# Patient Record
Sex: FEMALE | Race: BLACK OR AFRICAN AMERICAN | NOT HISPANIC OR LATINO | URBAN - METROPOLITAN AREA
[De-identification: names, ages, dates, MRNs, and addresses within clinical notes are randomized per-mention and may not be internally consistent; named-entity substitution may affect disease eponyms.]

---

## 2024-10-28 ENCOUNTER — OFFICE VISIT (OUTPATIENT)
Dept: URGENT CARE | Age: 44
End: 2024-10-28
Payer: COMMERCIAL

## 2024-10-28 VITALS
SYSTOLIC BLOOD PRESSURE: 116 MMHG | HEART RATE: 94 BPM | DIASTOLIC BLOOD PRESSURE: 79 MMHG | RESPIRATION RATE: 18 BRPM | BODY MASS INDEX: 25.2 KG/M2 | OXYGEN SATURATION: 100 % | HEIGHT: 71 IN | WEIGHT: 180 LBS

## 2024-10-28 DIAGNOSIS — R21 RASH AND NONSPECIFIC SKIN ERUPTION: Primary | ICD-10-CM

## 2024-10-28 PROCEDURE — 99203 OFFICE O/P NEW LOW 30 MIN: CPT

## 2024-10-28 PROCEDURE — 3008F BODY MASS INDEX DOCD: CPT

## 2024-10-28 PROCEDURE — 96372 THER/PROPH/DIAG INJ SC/IM: CPT

## 2024-10-28 RX ORDER — METHYLPREDNISOLONE SODIUM SUCCINATE 125 MG/2ML
125 INJECTION INTRAMUSCULAR; INTRAVENOUS ONCE
Status: COMPLETED | OUTPATIENT
Start: 2024-10-28 | End: 2024-10-28

## 2025-05-22 ENCOUNTER — OFFICE VISIT (OUTPATIENT)
Dept: URGENT CARE | Age: 45
End: 2025-05-22
Payer: COMMERCIAL

## 2025-05-22 VITALS
RESPIRATION RATE: 16 BRPM | DIASTOLIC BLOOD PRESSURE: 78 MMHG | HEART RATE: 96 BPM | OXYGEN SATURATION: 99 % | HEIGHT: 71 IN | WEIGHT: 183 LBS | TEMPERATURE: 99.6 F | SYSTOLIC BLOOD PRESSURE: 120 MMHG | BODY MASS INDEX: 25.62 KG/M2

## 2025-05-22 DIAGNOSIS — R21 RASH: Primary | ICD-10-CM

## 2025-05-22 PROCEDURE — 99213 OFFICE O/P EST LOW 20 MIN: CPT | Performed by: PERSONAL EMERGENCY RESPONSE ATTENDANT

## 2025-05-22 PROCEDURE — 1036F TOBACCO NON-USER: CPT | Performed by: PERSONAL EMERGENCY RESPONSE ATTENDANT

## 2025-05-22 PROCEDURE — 3008F BODY MASS INDEX DOCD: CPT | Performed by: PERSONAL EMERGENCY RESPONSE ATTENDANT

## 2025-05-22 RX ORDER — FERROUS SULFATE 325(65) MG
325 TABLET ORAL EVERY OTHER DAY
COMMUNITY
Start: 2017-06-27

## 2025-05-22 RX ORDER — METHYLPREDNISOLONE 4 MG/1
TABLET ORAL
Qty: 21 TABLET | Refills: 0 | Status: SHIPPED | OUTPATIENT
Start: 2025-05-22

## 2025-05-22 ASSESSMENT — ENCOUNTER SYMPTOMS
OCCASIONAL FEELINGS OF UNSTEADINESS: 0
LOSS OF SENSATION IN FEET: 0
CONSTITUTIONAL NEGATIVE: 1
DEPRESSION: 0
CARDIOVASCULAR NEGATIVE: 1
PSYCHIATRIC NEGATIVE: 1
MUSCULOSKELETAL NEGATIVE: 1

## 2025-05-22 ASSESSMENT — PAIN SCALES - GENERAL: PAINLEVEL_OUTOF10: 0-NO PAIN

## 2025-05-22 ASSESSMENT — PATIENT HEALTH QUESTIONNAIRE - PHQ9
2. FEELING DOWN, DEPRESSED OR HOPELESS: NOT AT ALL
SUM OF ALL RESPONSES TO PHQ9 QUESTIONS 1 AND 2: 0
1. LITTLE INTEREST OR PLEASURE IN DOING THINGS: NOT AT ALL

## 2025-05-22 NOTE — PROGRESS NOTES
"Subjective   Patient ID: Martha Patel is a 44 y.o. female. They present today with a chief complaint of Rash (Possible allergic reaction on hands, small little bumps that aren't itchy x 1 day.).    History of Present Illness  44-year-old female comes in today with a chief complaint of rash on her hands.  She states that she has also had some minor swelling.  She has been taking Benadryl with relief of her symptoms.  She is concerned that she may be having allergic reaction to something.  She denies any other hives, throat swelling or any other indication of an allergic or anaphylactic reaction.      Rash        Past Medical History  Allergies as of 05/22/2025 - Reviewed 05/22/2025   Allergen Reaction Noted    Beauty lotion Hives 10/28/2024       Prescriptions Prior to Admission[1]     Medical History[2]    Surgical History[3]     reports that she has never smoked. She has never been exposed to tobacco smoke. She has never used smokeless tobacco. She reports that she does not use drugs.    Review of Systems  Review of Systems   Constitutional: Negative.    HENT: Negative.     Cardiovascular: Negative.    Genitourinary: Negative.    Musculoskeletal: Negative.    Skin:  Positive for rash.   Psychiatric/Behavioral: Negative.     All other systems reviewed and are negative.                                 Objective    Vitals:    05/22/25 1936   BP: 120/78   BP Location: Left arm   Patient Position: Sitting   BP Cuff Size: Large adult   Pulse: 96   Resp: 16   Temp: 37.6 °C (99.6 °F)   TempSrc: Oral   SpO2: 99%   Weight: 83 kg (183 lb)   Height: 1.803 m (5' 11\")     No LMP recorded.    Physical Exam  Vitals and nursing note reviewed.   Constitutional:       Appearance: Normal appearance.   HENT:      Head: Normocephalic and atraumatic.   Cardiovascular:      Rate and Rhythm: Normal rate and regular rhythm.   Pulmonary:      Effort: Pulmonary effort is normal.   Musculoskeletal:         General: Normal range of motion.     "  Cervical back: Neck supple.   Skin:     Findings: Rash present.   Neurological:      Mental Status: She is alert.         Procedures    Point of Care Test & Imaging Results from this visit  No results found for this visit on 05/22/25.   Imaging  No results found.    Cardiology, Vascular, and Other Imaging  No other imaging results found for the past 2 days      Diagnostic study results (if any) were reviewed by Alexandro Reyna PA-C.    Assessment/Plan   Allergies, medications, history, and pertinent labs/EKGs/Imaging reviewed by Alexandro Reyna PA-C.     Medical Decision Making  44-year-old female who comes in today with a chief complaint of possible allergic reaction.  Patient stated that over the last day or so she has developed a rash on the palms of her hands.  She has been taking Benadryl and it has resolved most of her symptoms.  She did call and nurse on-call line who encouraged her to go to the emergency room for evaluation.  In talking with her, she stated that she got a iron infusion approximately 1 week ago And she believes that it may be related to that.  She gets an iron infusion approximately every 6 weeks and remembers that when she last had this in October, she had the same reaction.  At that time she was given a steroid shot which seemed to eliminate her symptoms.  At that time she also had some swelling of her hands, which she was trying to avoid at this point and was another reason why she came in today.  I did advise her to call her hematology doctor and ask about adverse reactions to her infusion and subsequent treatment.  I did call in a Medrol Dosepak for her in case her symptoms worsen or she was unable to contact her physician.  Patient is stable for discharge and request to go home.  Discharge instructions were given.      Orders and Diagnoses  Diagnoses and all orders for this visit:  Rash  -     methylPREDNISolone (Medrol Dospak) 4 mg tablets; Follow schedule on package  instructions      Medical Admin Record      Patient disposition: Home    Electronically signed by Alexandro Reyna PA-C  7:47 PM           [1] (Not in a hospital admission)  [2]   Past Medical History:  Diagnosis Date    Personal history of diseases of the blood and blood-forming organs and certain disorders involving the immune mechanism     History of anemia   [3]   Past Surgical History:  Procedure Laterality Date    EYE SURGERY  09/04/2014    Eye Surgery    OTHER SURGICAL HISTORY  01/30/2015    Uterine Myomectomy Abdominal Approach

## 2025-06-06 ENCOUNTER — APPOINTMENT (OUTPATIENT)
Dept: CARDIOLOGY | Facility: HOSPITAL | Age: 45
End: 2025-06-06
Payer: COMMERCIAL

## 2025-06-06 ENCOUNTER — APPOINTMENT (OUTPATIENT)
Dept: RADIOLOGY | Facility: HOSPITAL | Age: 45
End: 2025-06-06
Payer: COMMERCIAL

## 2025-06-06 ENCOUNTER — OFFICE VISIT (OUTPATIENT)
Dept: URGENT CARE | Age: 45
End: 2025-06-06
Payer: COMMERCIAL

## 2025-06-06 ENCOUNTER — HOSPITAL ENCOUNTER (EMERGENCY)
Facility: HOSPITAL | Age: 45
Discharge: HOME | End: 2025-06-06
Attending: EMERGENCY MEDICINE
Payer: COMMERCIAL

## 2025-06-06 VITALS
HEART RATE: 99 BPM | WEIGHT: 183 LBS | BODY MASS INDEX: 25.62 KG/M2 | OXYGEN SATURATION: 99 % | SYSTOLIC BLOOD PRESSURE: 158 MMHG | HEIGHT: 71 IN | TEMPERATURE: 98.4 F | DIASTOLIC BLOOD PRESSURE: 90 MMHG | RESPIRATION RATE: 18 BRPM

## 2025-06-06 VITALS
HEART RATE: 90 BPM | RESPIRATION RATE: 16 BRPM | TEMPERATURE: 98.2 F | SYSTOLIC BLOOD PRESSURE: 145 MMHG | DIASTOLIC BLOOD PRESSURE: 83 MMHG | OXYGEN SATURATION: 99 %

## 2025-06-06 DIAGNOSIS — R10.12 LEFT UPPER QUADRANT PAIN: ICD-10-CM

## 2025-06-06 DIAGNOSIS — M54.50 ACUTE MIDLINE LOW BACK PAIN WITHOUT SCIATICA: Primary | ICD-10-CM

## 2025-06-06 DIAGNOSIS — Z86.018 HISTORY OF UTERINE FIBROID: ICD-10-CM

## 2025-06-06 DIAGNOSIS — T50.905A ADVERSE EFFECT OF DRUG, INITIAL ENCOUNTER: ICD-10-CM

## 2025-06-06 DIAGNOSIS — R21 RASH: ICD-10-CM

## 2025-06-06 DIAGNOSIS — M54.6 THORACIC BACK PAIN, UNSPECIFIED BACK PAIN LATERALITY, UNSPECIFIED CHRONICITY: Primary | ICD-10-CM

## 2025-06-06 DIAGNOSIS — R14.1 GAS PAIN: ICD-10-CM

## 2025-06-06 LAB
ALBUMIN SERPL BCP-MCNC: 4 G/DL (ref 3.4–5)
ALP SERPL-CCNC: 45 U/L (ref 33–110)
ALT SERPL W P-5'-P-CCNC: 7 U/L (ref 7–45)
ANION GAP SERPL CALC-SCNC: 11 MMOL/L (ref 10–20)
AST SERPL W P-5'-P-CCNC: 11 U/L (ref 9–39)
B-HCG SERPL-ACNC: <2 MIU/ML
BASOPHILS # BLD AUTO: 0.02 X10*3/UL (ref 0–0.1)
BASOPHILS NFR BLD AUTO: 0.4 %
BILIRUB SERPL-MCNC: 0.3 MG/DL (ref 0–1.2)
BUN SERPL-MCNC: 8 MG/DL (ref 6–23)
CALCIUM SERPL-MCNC: 8.6 MG/DL (ref 8.6–10.3)
CARDIAC TROPONIN I PNL SERPL HS: 4 NG/L (ref 0–13)
CHLORIDE SERPL-SCNC: 107 MMOL/L (ref 98–107)
CO2 SERPL-SCNC: 24 MMOL/L (ref 21–32)
CREAT SERPL-MCNC: 0.5 MG/DL (ref 0.5–1.05)
CREAT SERPL-MCNC: 0.52 MG/DL (ref 0.5–1.05)
D DIMER PPP FEU-MCNC: 1325 NG/ML FEU
DACRYOCYTES BLD QL SMEAR: NORMAL
EGFRCR SERPLBLD CKD-EPI 2021: >90 ML/MIN/1.73M*2
EGFRCR SERPLBLD CKD-EPI 2021: >90 ML/MIN/1.73M*2
EOSINOPHIL # BLD AUTO: 0.04 X10*3/UL (ref 0–0.7)
EOSINOPHIL NFR BLD AUTO: 0.8 %
ERYTHROCYTE [DISTWIDTH] IN BLOOD BY AUTOMATED COUNT: 26.3 % (ref 11.5–14.5)
GLUCOSE SERPL-MCNC: 91 MG/DL (ref 74–99)
HCT VFR BLD AUTO: 32.6 % (ref 36–46)
HGB BLD-MCNC: 9.8 G/DL (ref 12–16)
HYPOCHROMIA BLD QL SMEAR: NORMAL
IMM GRANULOCYTES # BLD AUTO: 0.01 X10*3/UL (ref 0–0.7)
IMM GRANULOCYTES NFR BLD AUTO: 0.2 % (ref 0–0.9)
LIPASE SERPL-CCNC: 20 U/L (ref 9–82)
LYMPHOCYTES # BLD AUTO: 0.56 X10*3/UL (ref 1.2–4.8)
LYMPHOCYTES NFR BLD AUTO: 11.7 %
MCH RBC QN AUTO: 24.5 PG (ref 26–34)
MCHC RBC AUTO-ENTMCNC: 30.1 G/DL (ref 32–36)
MCV RBC AUTO: 82 FL (ref 80–100)
MONOCYTES # BLD AUTO: 0.58 X10*3/UL (ref 0.1–1)
MONOCYTES NFR BLD AUTO: 12.1 %
NEUTROPHILS # BLD AUTO: 3.57 X10*3/UL (ref 1.2–7.7)
NEUTROPHILS NFR BLD AUTO: 74.8 %
NRBC BLD-RTO: 0 /100 WBCS (ref 0–0)
OVALOCYTES BLD QL SMEAR: NORMAL
PLATELET # BLD AUTO: 360 X10*3/UL (ref 150–450)
POC APPEARANCE, URINE: CLEAR
POC BILIRUBIN, URINE: NEGATIVE
POC BLOOD, URINE: NEGATIVE
POC COLOR, URINE: YELLOW
POC GLUCOSE, URINE: NEGATIVE MG/DL
POC KETONES, URINE: NEGATIVE MG/DL
POC LEUKOCYTES, URINE: NEGATIVE
POC NITRITE,URINE: NEGATIVE
POC PH, URINE: 6 PH
POC PROTEIN, URINE: NEGATIVE MG/DL
POC SPECIFIC GRAVITY, URINE: 1.01
POC UROBILINOGEN, URINE: 0.2 EU/DL
POLYCHROMASIA BLD QL SMEAR: NORMAL
POTASSIUM SERPL-SCNC: 4.1 MMOL/L (ref 3.5–5.3)
PREGNANCY TEST URINE, POC: NEGATIVE
PROT SERPL-MCNC: 6.9 G/DL (ref 6.4–8.2)
RBC # BLD AUTO: 4 X10*6/UL (ref 4–5.2)
RBC MORPH BLD: NORMAL
SCHISTOCYTES BLD QL SMEAR: NORMAL
SODIUM SERPL-SCNC: 138 MMOL/L (ref 136–145)
WBC # BLD AUTO: 4.8 X10*3/UL (ref 4.4–11.3)

## 2025-06-06 PROCEDURE — 2500000004 HC RX 250 GENERAL PHARMACY W/ HCPCS (ALT 636 FOR OP/ED): Performed by: EMERGENCY MEDICINE

## 2025-06-06 PROCEDURE — 82565 ASSAY OF CREATININE: CPT | Performed by: EMERGENCY MEDICINE

## 2025-06-06 PROCEDURE — 36415 COLL VENOUS BLD VENIPUNCTURE: CPT

## 2025-06-06 PROCEDURE — 93005 ELECTROCARDIOGRAM TRACING: CPT

## 2025-06-06 PROCEDURE — 2500000005 HC RX 250 GENERAL PHARMACY W/O HCPCS: Performed by: EMERGENCY MEDICINE

## 2025-06-06 PROCEDURE — 84484 ASSAY OF TROPONIN QUANT: CPT

## 2025-06-06 PROCEDURE — 96374 THER/PROPH/DIAG INJ IV PUSH: CPT

## 2025-06-06 PROCEDURE — 71275 CT ANGIOGRAPHY CHEST: CPT | Performed by: STUDENT IN AN ORGANIZED HEALTH CARE EDUCATION/TRAINING PROGRAM

## 2025-06-06 PROCEDURE — 71275 CT ANGIOGRAPHY CHEST: CPT

## 2025-06-06 PROCEDURE — 80053 COMPREHEN METABOLIC PANEL: CPT

## 2025-06-06 PROCEDURE — 84702 CHORIONIC GONADOTROPIN TEST: CPT

## 2025-06-06 PROCEDURE — 83690 ASSAY OF LIPASE: CPT

## 2025-06-06 PROCEDURE — 85379 FIBRIN DEGRADATION QUANT: CPT

## 2025-06-06 PROCEDURE — 85025 COMPLETE CBC W/AUTO DIFF WBC: CPT

## 2025-06-06 PROCEDURE — 74177 CT ABD & PELVIS W/CONTRAST: CPT | Performed by: STUDENT IN AN ORGANIZED HEALTH CARE EDUCATION/TRAINING PROGRAM

## 2025-06-06 PROCEDURE — 99285 EMERGENCY DEPT VISIT HI MDM: CPT | Mod: 25 | Performed by: EMERGENCY MEDICINE

## 2025-06-06 PROCEDURE — 2550000001 HC RX 255 CONTRASTS: Performed by: EMERGENCY MEDICINE

## 2025-06-06 PROCEDURE — 74177 CT ABD & PELVIS W/CONTRAST: CPT

## 2025-06-06 RX ORDER — KETOROLAC TROMETHAMINE 30 MG/ML
15 INJECTION, SOLUTION INTRAMUSCULAR; INTRAVENOUS ONCE
Status: COMPLETED | OUTPATIENT
Start: 2025-06-06 | End: 2025-06-06

## 2025-06-06 RX ORDER — IBUPROFEN 600 MG/1
600 TABLET, FILM COATED ORAL EVERY 8 HOURS PRN
Qty: 15 TABLET | Refills: 0 | Status: SHIPPED | OUTPATIENT
Start: 2025-06-06 | End: 2025-06-11

## 2025-06-06 RX ORDER — TRANEXAMIC ACID 650 MG/1
TABLET ORAL
COMMUNITY

## 2025-06-06 RX ORDER — LIDOCAINE 560 MG/1
1 PATCH PERCUTANEOUS; TOPICAL; TRANSDERMAL DAILY
Qty: 5 PATCH | Refills: 0 | Status: SHIPPED | OUTPATIENT
Start: 2025-06-06 | End: 2025-06-11

## 2025-06-06 RX ORDER — CYCLOBENZAPRINE HCL 10 MG
10 TABLET ORAL NIGHTLY
Qty: 7 TABLET | Refills: 0 | Status: SHIPPED | OUTPATIENT
Start: 2025-06-06 | End: 2025-06-06

## 2025-06-06 RX ORDER — LIDOCAINE 560 MG/1
1 PATCH PERCUTANEOUS; TOPICAL; TRANSDERMAL ONCE
Status: DISCONTINUED | OUTPATIENT
Start: 2025-06-06 | End: 2025-06-06 | Stop reason: HOSPADM

## 2025-06-06 RX ORDER — CYCLOBENZAPRINE HCL 10 MG
10 TABLET ORAL NIGHTLY
Qty: 7 TABLET | Refills: 0 | Status: SHIPPED | OUTPATIENT
Start: 2025-06-06 | End: 2025-06-13

## 2025-06-06 RX ADMIN — KETOROLAC TROMETHAMINE 15 MG: 30 INJECTION, SOLUTION INTRAMUSCULAR at 20:02

## 2025-06-06 RX ADMIN — LIDOCAINE 4% 1 PATCH: 40 PATCH TOPICAL at 20:06

## 2025-06-06 RX ADMIN — IOHEXOL 75 ML: 350 INJECTION, SOLUTION INTRAVENOUS at 18:26

## 2025-06-06 ASSESSMENT — PAIN - FUNCTIONAL ASSESSMENT
PAIN_FUNCTIONAL_ASSESSMENT: 0-10

## 2025-06-06 ASSESSMENT — COLUMBIA-SUICIDE SEVERITY RATING SCALE - C-SSRS
6. HAVE YOU EVER DONE ANYTHING, STARTED TO DO ANYTHING, OR PREPARED TO DO ANYTHING TO END YOUR LIFE?: NO
2. HAVE YOU ACTUALLY HAD ANY THOUGHTS OF KILLING YOURSELF?: NO
1. IN THE PAST MONTH, HAVE YOU WISHED YOU WERE DEAD OR WISHED YOU COULD GO TO SLEEP AND NOT WAKE UP?: NO

## 2025-06-06 ASSESSMENT — ENCOUNTER SYMPTOMS
NAUSEA: 0
SORE THROAT: 0
MYALGIAS: 1
BACK PAIN: 1
WEAKNESS: 0
RHINORRHEA: 0
COUGH: 0
ABDOMINAL PAIN: 1
CHILLS: 0
SINUS PRESSURE: 0
FEVER: 0
VOMITING: 0
DIARRHEA: 0
DIZZINESS: 0
SINUS PAIN: 0
FATIGUE: 0
HEADACHES: 0
SHORTNESS OF BREATH: 0
ACTIVITY CHANGE: 1

## 2025-06-06 ASSESSMENT — PAIN DESCRIPTION - FREQUENCY: FREQUENCY: CONSTANT/CONTINUOUS

## 2025-06-06 ASSESSMENT — PAIN SCALES - GENERAL
PAINLEVEL_OUTOF10: 2
PAINLEVEL_OUTOF10: 1
PAINLEVEL_OUTOF10: 2

## 2025-06-06 ASSESSMENT — PAIN DESCRIPTION - PAIN TYPE: TYPE: ACUTE PAIN

## 2025-06-06 ASSESSMENT — PAIN DESCRIPTION - LOCATION: LOCATION: BACK

## 2025-06-06 ASSESSMENT — PAIN DESCRIPTION - PROGRESSION: CLINICAL_PROGRESSION: GRADUALLY IMPROVING

## 2025-06-06 ASSESSMENT — PAIN DESCRIPTION - ONSET: ONSET: GRADUAL

## 2025-06-06 ASSESSMENT — PATIENT HEALTH QUESTIONNAIRE - PHQ9
2. FEELING DOWN, DEPRESSED OR HOPELESS: NOT AT ALL
1. LITTLE INTEREST OR PLEASURE IN DOING THINGS: NOT AT ALL
SUM OF ALL RESPONSES TO PHQ9 QUESTIONS 1 AND 2: 0

## 2025-06-06 ASSESSMENT — ACTIVITIES OF DAILY LIVING (ADL): EFFECT OF PAIN ON DAILY ACTIVITIES: DECREASED

## 2025-06-06 ASSESSMENT — PAIN DESCRIPTION - DESCRIPTORS: DESCRIPTORS: ACHING;SHARP

## 2025-06-06 NOTE — PATIENT INSTRUCTIONS
You have agreed to be seen in the emergency room following today's visit.  Failure to be seen and delaying care can lead to complications including, however not limited to, long-term disability.    Please call hematologist regarding your symptoms

## 2025-06-06 NOTE — ED TRIAGE NOTES
"Pt to ED from home with c/o \"spasm-like\" pain in LUQ rated 8/10. Stated that it went to a 2/10 with ibuprofen. Pt is concerned this pain may be related to her iron infusion that she had last year. Pt states the back pain has been worsening since Monday, other symptoms include chills and bloating. AO x4, denies CP/SoB.  "

## 2025-06-06 NOTE — ED NOTES
Report given to [facility]. No significant detrimental changes prior to handoff. Neuro/skin/respiratory grossly WDL.      Damian Ruff RN  06/06/25 [id number]

## 2025-06-06 NOTE — PROGRESS NOTES
Subjective   Patient ID: Martha Patel is a 44 y.o. female. They present today with a chief complaint of Back Pain and Flank Pain.    History of Present Illness  This is a 44-year-old female who presents to the clinic today for evaluation of lower back pain that started on Tuesday.  Pain is midline low back.  Patient states movement makes it better.  Pain is keeping her up at night.  She laid on a different bed in the house which helped.  Denies loss of bowel or bladder function.  Denies numbness or tingling. Patient denies injury or trauma.  Patient also complaining of gas pain in her upper right shoulder and in left abdomen.  Patient also complaining about rash/swelling in bilateral palms that has resolved status post taking Pepcid.  Patient states she has iron infusions monthly.  Last infusion was on May 15, 2025.  Has had delayed reaction including the rash on her hands after first iron reaction and was given steroids and Pepcid.  Patient states she did take ibuprofen which is helping her back.  Took Pepcid which is helping with the swelling and rash.  Last bowel movement was today and was normal.  Denies increased work of breathing, use of accessory muscle, cyanosis, apnea, wheezing, stridor, shortness of breath.  Denies new soaps, lotions, medications, pets, foods.  Denies change in bowel or bladder function.  Denies GI complaints.            Past Medical History  Allergies as of 06/06/2025    (No Known Allergies)       Prescriptions Prior to Admission[1]     Medical History[2]    Surgical History[3]     reports that she has never smoked. She has never been exposed to tobacco smoke. She has never used smokeless tobacco. She reports that she does not drink alcohol and does not use drugs.    Review of Systems  Review of Systems   Constitutional:  Positive for activity change. Negative for chills, fatigue and fever.   HENT:  Negative for congestion, ear pain, rhinorrhea, sinus pressure, sinus pain and sore  throat.    Eyes:  Negative for visual disturbance.   Respiratory:  Negative for cough and shortness of breath.    Cardiovascular:  Negative for chest pain and leg swelling.   Gastrointestinal:  Positive for abdominal pain. Negative for diarrhea, nausea and vomiting.   Musculoskeletal:  Positive for back pain and myalgias.   Skin:  Positive for rash.   Neurological:  Negative for dizziness, weakness and headaches.   All other systems reviewed and are negative.                                 Objective    Vitals:    06/06/25 1112   BP: 145/83   Pulse: 90   Resp: 16   Temp: 36.8 °C (98.2 °F)   SpO2: 99%     No LMP recorded (lmp unknown).    Physical Exam  Vitals reviewed.   Constitutional:       Appearance: Normal appearance.   HENT:      Head: Normocephalic and atraumatic.      Right Ear: Tympanic membrane, ear canal and external ear normal.      Left Ear: Tympanic membrane, ear canal and external ear normal.      Nose: Nose normal.      Mouth/Throat:      Mouth: Mucous membranes are moist.      Pharynx: Oropharynx is clear.   Eyes:      Extraocular Movements: Extraocular movements intact.      Conjunctiva/sclera: Conjunctivae normal.      Pupils: Pupils are equal, round, and reactive to light.   Cardiovascular:      Rate and Rhythm: Normal rate and regular rhythm.      Pulses: Normal pulses.      Heart sounds: Normal heart sounds.   Pulmonary:      Effort: Pulmonary effort is normal.      Breath sounds: Normal breath sounds.   Abdominal:      General: Abdomen is flat. Bowel sounds are normal. There is distension.      Palpations: Abdomen is soft.      Comments: Percussion with increased tympany throughout   Musculoskeletal:         General: Normal range of motion.      Cervical back: Normal range of motion and neck supple.      Comments: Patient has full flexion, extension, rotation in lumbar spine.  There are no lesions, masses, rashes, abrasions, or rashes.  Patient has paraspinous tenderness and spasm.  No  vertebral/midline tenderness.  Strength 5/5 equal bilaterally, pulses +2/4 equal bilaterally, cap refill less than 2 seconds throughout.  Patellar and Achilles reflexes +2/4 equal bilaterally.   Skin:     General: Skin is warm and dry.      Capillary Refill: Capillary refill takes less than 2 seconds.   Neurological:      General: No focal deficit present.      Mental Status: She is alert and oriented to person, place, and time.         Procedures    Point of Care Test & Imaging Results from this visit  Results for orders placed or performed in visit on 06/06/25   POCT pregnancy, urine manually resulted   Result Value Ref Range    Preg Test, Ur Negative Negative   POCT UA Automated manually resulted   Result Value Ref Range    POC Color, Urine Yellow Straw, Yellow, Light-Yellow    POC Appearance, Urine Clear Clear    POC Glucose, Urine NEGATIVE NEGATIVE mg/dl    POC Bilirubin, Urine NEGATIVE NEGATIVE    POC Ketones, Urine NEGATIVE NEGATIVE mg/dl    POC Specific Gravity, Urine 1.015 1.005 - 1.035    POC Blood, Urine NEGATIVE NEGATIVE    POC PH, Urine 6.0 No Reference Range Established PH    POC Protein, Urine NEGATIVE NEGATIVE mg/dl    POC Urobilinogen, Urine 0.2 0.2, 1.0 EU/DL    Poc Nitrite, Urine NEGATIVE NEGATIVE    POC Leukocytes, Urine NEGATIVE NEGATIVE      Imaging  No results found.    Cardiology, Vascular, and Other Imaging  No other imaging results found for the past 2 days      Diagnostic study results (if any) were reviewed by Kristin L Schoenlein, APRN-CNP.    Assessment/Plan   Allergies, medications, history, and pertinent labs/EKGs/Imaging reviewed by Kristin L Schoenlein, APRN-CNP.     Medical Decision Making  VSS, NAD, Nontoxic appearing.  UA unremarkable.  hCG negative.  Currently do not appreciate rash or swelling in bilateral hands.  Concerning abdominal finding of abdominal distention with increased tympany via percussion with questionable consistent free air.  Additional physical exam as  document above.  Unknown etiology of symptoms at this time and I have a high suspicion that they are from her due to her iron infusion.  I recommended emergency room evaluation for lab work and possible imaging.  Patient refused AGAINST MEDICAL ADVICE knowing that delaying care lead to symptoms of, but not limited to, long-term disability and death.  AMA paperwork was filled out and patient was given copy.  Patient will be contacting her hematologist.  All questions answered and addressed.  Patient is in agreement of plan of care and verbalized understanding.      Orders and Diagnoses  Diagnoses and all orders for this visit:  Acute midline low back pain without sciatica  -     POCT pregnancy, urine manually resulted  -     POCT UA Automated manually resulted  Gas pain  Left upper quadrant pain  Rash      Medical Admin Record      Patient disposition: Home    Electronically signed by Kristin L Schoenlein, APRN-CNP  5:32 PM           [1] (Not in a hospital admission)   [2]   Past Medical History:  Diagnosis Date    Personal history of diseases of the blood and blood-forming organs and certain disorders involving the immune mechanism     History of anemia   [3]   Past Surgical History:  Procedure Laterality Date    EYE SURGERY  09/04/2014    Eye Surgery    OTHER SURGICAL HISTORY  01/30/2015    Uterine Myomectomy Abdominal Approach

## 2025-06-06 NOTE — ED TRIAGE NOTES
TRIAGE NOTE   I saw the patient as the Clinician in Triage and performed a brief history and physical exam, established acuity, and ordered appropriate tests to develop basic plan of care. Patient will be seen by an [facility], resident and/or physician who will independently evaluate the patient. Please see subsequent provider notes for further details and disposition.     Brief HPI: In brief, Martha Patel is a 44 y.o. female that presents for multiple medical complaints.  Has pain in the low back on both sides especially breathing for the past couple days.  No fevers.  No nausea or vomiting.  No saddle anesthesia or urinary/fecal incontinence or retention.  No urinary symptoms.  No history of VTE.  No other symptoms or concerns at this time.    Focused Physical exam:   Tachycardic at a regular rhythm.  No murmurs rubs or gallops.  Lungs clear to auscultation bilaterally.  Regular wheezing or rales.  5/5 strength in upper and lower extremities bilaterally.  Sensation intact in upper and lower extremities bilaterally.  No rash.  No reproducible musculoskeletal tenderness.  Plan/MDM:   Initiating basic labs, dimer, troponin, lipase, EKG.  Also initiating urinalysis and hCG.  Patient will be seen in the back of the ED for further evaluation and treatment.  I will not be following with this patient during her ED visit.  This is a preliminary evaluation during triage.    I evaluated this patient in triage with the RN. Due to the patients complaint labs and or imaging were ordered by myself in an attempt to expedite patient care however I am not participating in care after evaluation. This is a preliminary assessment. Pt does not appear in acute distress at this time. They will have a full evaluation as soon as possible. They will be cared for by another provider who will possibly order more labs, imaging or interventions. Pt did not have a full ROS or PE completed by myself however below is a summary with reasons for  orders.  For the remainder of the patient's workup and ED course, please refer to the main ED provider note. We discussed need for diagnostic testing including laboratory studies and imaging.  We also discussed that patient may be asked to wait in the waiting room while these tests are pending.  They understand that if they choose to leave without having the testing completed or resulted that we cannot rule out acute life threatening illnesses and the risks involved could lead to worsening condition, permanent disability or even death.     Please see subsequent provider note for further details and disposition

## 2025-06-07 NOTE — DISCHARGE INSTRUCTIONS
Below are the results of your CT imaging to discuss with your primary care physician.  Make sure to take medications as indicated for length of time instructed.  Return immediately if concerning symptoms, as discussed.    IMPRESSION:  1. Uterus is markedly enlarged and bulky in appearance with numerous  subserosal fibroids, which exhibits some mass effect on the adjacent  structures, including some effacement of the adjacent bowel and  bladder. There is likely large subserosal fibroid also efface in the  endometrial cavity.  2. Otherwise, no acute abnormality is identified within the abdomen  or pelvis.  3. Small amount of free fluid in the pelvis is nonspecific, and may  be physiologic.    IMPRESSION:  1. No evidence of the large central pulmonary embolism through the  proximal segmental level, although more distal segmental vessels are  suboptimally evaluated due to motion and beam hardening artifact,  especially at the left lung base.  2. Small left-sided pleural effusion with some volume  loss/consolidation in the lingula and left lower lobe. Correlate with  any symptoms of pneumonia.

## 2025-06-07 NOTE — ED PROVIDER NOTES
Emergency Department Provider Note     HPI  Patient is a 44-year-old female with a past medical history significant for uterine fibroids, leukopenia and chronic TXA/iron infusions who presents to the emergency room with a chief complaint of back pain.  She states that it started on Tuesday and is atraumatic.  She feels it across her low to mid back.  She denies any chest pain, shortness of breath, nausea, vomiting, diarrhea, dysuria, hematuria.  She denies any traumatic injuries.  She denies any midline back pain.  She denies any incontinence of bowel or bladder, saddle anesthesia.  She denies any cough, fever, chills.  She was initially seen at urgent care and advised to come to the emergency room as at that time she was also seeing some abdominal distention and was found to have increased tympany on percussion.  It appears that from that visit she left AGAINST MEDICAL ADVICE but then presented to the emergency room for evaluation.  Present time she is describing the pain is somewhat pleuritic to the back but not the chest.  Denies any leg pain or swelling.      PMHx: As above  PSHx: Cholecystectomy  FamilyHx: Denies pertinent  SocialHx:  Allergies: Denies NKDA  Medications: See Medication Reconciliation     ROS  As above otherwise denies      Physical Exam    GENERAL: Awake and Alert, No Acute Distress  HEENT: AT/NC, PERRL, EOMI, Normal Oropharynx, No Signs of Dehydration  NECK: Normal Inspection, No JVD  CARDIOVASCULAR: RRR, No M/R/G  RESPIRATORY: CTA Bilaterally, No Wheezes, Rales or Rhonchi, Chest Wall Non-tender  ABDOMEN: Soft, non-tender abdomen, Normal Bowel Sounds, No Distention  BACK: No CVA Tenderness  SKIN: Normal Color, Warm, Dry, No Rashes   EXTREMITIES: Non-Tender, Full ROM, No Pedal Edema  NEURO: A&O x 3, Normal Motor and Sensation, Normal Mood and Affect    Nursing Assessment and Vitals Reviewed    EKG on arrival showed sinus tachycardia at 108 bpm.  There are no significant interval prolongations  or ischemic ST changes.  No T wave inversions or axis deviation.    Medical Decision  Patient is a 44-year-old female with a past medical history significant for uterine fibroids, leukopenia and chronic TXA/iron infusions who presents to the emergency room with a chief complaint of back pain.  She states that it started on Tuesday and is atraumatic.  She feels it across her low to mid back.  She denies any chest pain, shortness of breath, nausea, vomiting, diarrhea, dysuria, hematuria.  She denies any traumatic injuries.  She denies any midline back pain.  She denies any incontinence of bowel or bladder, saddle anesthesia.  She denies any cough, fever, chills.  She was initially seen at urgent care and advised to come to the emergency room as at that time she was also seeing some abdominal distention and was found to have increased tympany on percussion.  It appears that from that visit she left AGAINST MEDICAL ADVICE but then presented to the emergency room for evaluation.  Present time she is describing the pain is somewhat pleuritic to the back but not the chest.  Denies any leg pain or swelling.      Evaluation she is seen in the hallway owing to large patient volumes.  At that time she is very well-appearing and in no acute distress.  Lungs are clear and heart is regular.  Abdomen is slightly distended but nontender.  She has no C, T, L-spine tenderness to palpation.  She is seen in the hallway owing to large patient volume and at that time she is ambulatory without signs of distress and sitting in the chair by preference as she finds it comfortable.    Workup for patient included labs that revealed an elevated D-dimer and a CT PE as well as abdomen and pelvis are added.  Otherwise she has no electrolyte imbalance, normal troponin and no leukocytosis.  She does have anemia with a hemoglobin of 9.8 which is consistent with patient history.  Urinalysis did not show signs of infection.  CT angio PE showed no large  PE.  She has a very small left-sided pleural effusion with some questionable consolidation in the lingula but patient denies any chest pain, shortness of breath, cough, fever, chills or any signs of pneumonia.  CT of the abdomen pelvis showed known uterine fibroids but otherwise no emergent pathology.    She was treated with Toradol and Lidoderm patches.  Will discharge home with prescription for ibuprofen, Flexeril and Lidoderm patches for presumed musculoskeletal pain as her workup and physical exam are otherwise reassuring.  She is urged to follow-up closely with her primary care physician this week.  She is educated on supportive care at home as well as signs and symptoms that should prompt an immediate turn to emergency room.    Marissa Reed MD  Emergency Medicine                                                       Marissa Reed MD  06/06/25 [id number]

## 2025-06-14 LAB
ATRIAL RATE: 108 BPM
P AXIS: 70 DEGREES
P OFFSET: 202 MS
P ONSET: 152 MS
PR INTERVAL: 140 MS
Q ONSET: 222 MS
QRS COUNT: 18 BEATS
QRS DURATION: 68 MS
QT INTERVAL: 334 MS
QTC CALCULATION(BAZETT): 447 MS
QTC FREDERICIA: 406 MS
R AXIS: 54 DEGREES
T AXIS: 23 DEGREES
T OFFSET: 389 MS
VENTRICULAR RATE: 108 BPM